# Patient Record
Sex: FEMALE | Race: OTHER | Employment: FULL TIME | URBAN - METROPOLITAN AREA
[De-identification: names, ages, dates, MRNs, and addresses within clinical notes are randomized per-mention and may not be internally consistent; named-entity substitution may affect disease eponyms.]

---

## 2021-02-15 ENCOUNTER — OFFICE VISIT (OUTPATIENT)
Dept: RHEUMATOLOGY | Facility: CLINIC | Age: 41
End: 2021-02-15
Payer: COMMERCIAL

## 2021-02-15 VITALS
HEIGHT: 63 IN | WEIGHT: 225 LBS | SYSTOLIC BLOOD PRESSURE: 130 MMHG | HEART RATE: 105 BPM | BODY MASS INDEX: 39.87 KG/M2 | DIASTOLIC BLOOD PRESSURE: 85 MMHG

## 2021-02-15 DIAGNOSIS — M79.18 DIFFUSE MYOFASCIAL PAIN SYNDROME: ICD-10-CM

## 2021-02-15 DIAGNOSIS — M47.816 LUMBAR SPONDYLOSIS: Primary | ICD-10-CM

## 2021-02-15 DIAGNOSIS — M15.9 PRIMARY OSTEOARTHRITIS INVOLVING MULTIPLE JOINTS: ICD-10-CM

## 2021-02-15 PROBLEM — M15.0 PRIMARY OSTEOARTHRITIS INVOLVING MULTIPLE JOINTS: Status: ACTIVE | Noted: 2021-02-15

## 2021-02-15 PROCEDURE — 3075F SYST BP GE 130 - 139MM HG: CPT | Performed by: INTERNAL MEDICINE

## 2021-02-15 PROCEDURE — 99244 OFF/OP CNSLTJ NEW/EST MOD 40: CPT | Performed by: INTERNAL MEDICINE

## 2021-02-15 PROCEDURE — 3008F BODY MASS INDEX DOCD: CPT | Performed by: INTERNAL MEDICINE

## 2021-02-15 PROCEDURE — 3079F DIAST BP 80-89 MM HG: CPT | Performed by: INTERNAL MEDICINE

## 2021-02-15 RX ORDER — PHENTERMINE HYDROCHLORIDE 37.5 MG/1
37.5 TABLET ORAL DAILY
COMMUNITY
Start: 2020-09-29

## 2021-02-15 RX ORDER — MELOXICAM 15 MG/1
15 TABLET ORAL AS NEEDED
COMMUNITY
End: 2021-02-15

## 2021-02-15 RX ORDER — MELOXICAM 15 MG/1
TABLET ORAL
Qty: 30 TABLET | Refills: 2 | Status: SHIPPED | OUTPATIENT
Start: 2021-02-15 | End: 2021-04-09

## 2021-02-15 RX ORDER — GABAPENTIN 100 MG/1
CAPSULE ORAL
Qty: 60 CAPSULE | Refills: 2 | Status: SHIPPED | OUTPATIENT
Start: 2021-02-15

## 2021-02-15 NOTE — PROGRESS NOTES
Dear Claudia Fung:    I saw your patient Epifanio Dutta in consultation this morning at your request, for evaluation of musculoskeletal pain. As you know, she is a 13-year-old woman who has had trouble with her right knee since she was a kid.   Also her low ba units of vitamin D weekly, levothyroxine, meloxicam 50 mg twice a week, ibuprofen 800 mg once a week, and phentermine. No known drug allergies. Family history:  Negative for autoimmune disorders. Social history:  She is single with no children.   She pathology. 3.  Diffuse myofascial pain, nonrestorative sleep, fatigue. She will try gabapentin 100 to 200 mg before bedtime. An exercise program should help. 4.  Obesity. You may want to refer her to the bariatrics clinic.       Thank you for invit

## 2021-03-01 ENCOUNTER — TELEPHONE (OUTPATIENT)
Dept: PHYSICAL THERAPY | Facility: HOSPITAL | Age: 41
End: 2021-03-01

## 2021-03-02 ENCOUNTER — OFFICE VISIT (OUTPATIENT)
Dept: PHYSICAL THERAPY | Facility: HOSPITAL | Age: 41
End: 2021-03-02
Attending: INTERNAL MEDICINE
Payer: COMMERCIAL

## 2021-03-02 DIAGNOSIS — M47.816 LUMBAR SPONDYLOSIS: ICD-10-CM

## 2021-03-02 DIAGNOSIS — M79.18 DIFFUSE MYOFASCIAL PAIN SYNDROME: ICD-10-CM

## 2021-03-02 DIAGNOSIS — M15.9 PRIMARY OSTEOARTHRITIS INVOLVING MULTIPLE JOINTS: ICD-10-CM

## 2021-03-02 PROCEDURE — 97140 MANUAL THERAPY 1/> REGIONS: CPT

## 2021-03-02 PROCEDURE — 97110 THERAPEUTIC EXERCISES: CPT

## 2021-03-02 PROCEDURE — 97162 PT EVAL MOD COMPLEX 30 MIN: CPT

## 2021-03-02 NOTE — PROGRESS NOTES
SPINE EVALUATION:   Referring Physician: Dr. Jessica Aquino  Diagnosis: Dx: Lumbar spondylosis (A87.901)  Primary osteoarthritis involving multiple joints (M89.49)  Diffuse myofascial pain syndrome (M79.18) Date of Service: 3/2/2021     PATIENT SUMMARY   Dann Blackwood than 10 lbs. Signs and symptoms are consistent with diagnosis of extension rotation syndrome and Femoral anterior glide medial rotation with semgental mobility restrictions of the lumbosacral spine and hips. Stage of condition chronic.  Irritability modera expectations, and prognosis.  Pt was also provided recommendations for activity modifications, possible soreness after evaluation and importance of remaining active  Patient was instructed in and issued a HEP for: quadruped rocking  Tx: Long axis traction S [phone]    Sincerely,  Electronically signed by therapist: Ronak Figueroa PT    [de-identified] certification required: Yes  I certify the need for these services furnished under this plan of treatment and while under my care.     X________________________

## 2021-03-09 ENCOUNTER — OFFICE VISIT (OUTPATIENT)
Dept: PHYSICAL THERAPY | Facility: HOSPITAL | Age: 41
End: 2021-03-09
Attending: INTERNAL MEDICINE
Payer: COMMERCIAL

## 2021-03-09 PROCEDURE — 97140 MANUAL THERAPY 1/> REGIONS: CPT

## 2021-03-09 PROCEDURE — 97110 THERAPEUTIC EXERCISES: CPT

## 2021-03-09 NOTE — PROGRESS NOTES
Dx:  Lumbar spondylosis (M47.816)  Primary osteoarthritis involving multiple joints (M89.49)  Diffuse myofascial pain syndrome (M79.18)           Insurance (Authorized # of Visits): 12  ([id number])           Authorizing Physician: Dr. Bibi Geiger hold 5 sec x10  -TA bracing hold 10 sec (x5 min)  -Segmental lumbar flexion seated and standing x5  -Bed mobility training with TA bracing       HEP - added TA bracing, Segmental lumbar flexion standing                  Charges: MT, EX 2       Total Timed

## 2021-03-12 ENCOUNTER — OFFICE VISIT (OUTPATIENT)
Dept: PHYSICAL THERAPY | Facility: HOSPITAL | Age: 41
End: 2021-03-12
Attending: INTERNAL MEDICINE
Payer: COMMERCIAL

## 2021-03-12 PROCEDURE — 97110 THERAPEUTIC EXERCISES: CPT

## 2021-03-12 PROCEDURE — 97140 MANUAL THERAPY 1/> REGIONS: CPT

## 2021-03-12 NOTE — PROGRESS NOTES
Dx:  Lumbar spondylosis (M47.816)  Primary osteoarthritis involving multiple joints (M89.49)  Diffuse myofascial pain syndrome (M79.18)           Insurance (Authorized # of Visits): 12  ([id number])           Authorizing Physician: Dr. Mary Smith axis traction manipulation  -Prone anterior femoral glides gr 3 bilat  -Prone FABERs mobs gr 3 bilat      EX  -MET lumbar multifidi   -S/L clams with TA brace bilat hold 5 sec x10  -TA bracing hold 10 sec (x5 min)  -Segmental lumbar flexion seated and maikol

## 2021-03-15 ENCOUNTER — OFFICE VISIT (OUTPATIENT)
Dept: PHYSICAL THERAPY | Facility: HOSPITAL | Age: 41
End: 2021-03-15
Attending: INTERNAL MEDICINE
Payer: COMMERCIAL

## 2021-03-15 PROCEDURE — 97110 THERAPEUTIC EXERCISES: CPT

## 2021-03-15 PROCEDURE — 97140 MANUAL THERAPY 1/> REGIONS: CPT

## 2021-03-15 NOTE — PROGRESS NOTES
Dx:  Lumbar spondylosis (M47.816)  Primary osteoarthritis involving multiple joints (M89.49)  Diffuse myofascial pain syndrome (M79.18)           Insurance (Authorized # of Visits): 12  ([id number] [facility])           Authorizing Physician: Dr. Gala Mayo adama gr 3 bilat  -Prone Kana arisa gr 3 bilat MT  -STM R TFL and LFC  -Psoas release R supine  -mid T/S and TL manip  -bilat femoral long axis traction manipulation     EX  -MET lumbar multifidi   -S/L clams with TA brace bilat hold 5 sec x10  -TA braci

## 2021-03-18 ENCOUNTER — APPOINTMENT (OUTPATIENT)
Dept: PHYSICAL THERAPY | Facility: HOSPITAL | Age: 41
End: 2021-03-18
Attending: INTERNAL MEDICINE
Payer: COMMERCIAL

## 2021-03-18 ENCOUNTER — APPOINTMENT (OUTPATIENT)
Dept: PHYSICAL THERAPY | Facility: HOSPITAL | Age: 41
End: 2021-03-18
Payer: COMMERCIAL

## 2021-03-22 ENCOUNTER — OFFICE VISIT (OUTPATIENT)
Dept: PHYSICAL THERAPY | Facility: HOSPITAL | Age: 41
End: 2021-03-22
Attending: INTERNAL MEDICINE
Payer: COMMERCIAL

## 2021-03-22 PROCEDURE — 97110 THERAPEUTIC EXERCISES: CPT

## 2021-03-22 PROCEDURE — 97140 MANUAL THERAPY 1/> REGIONS: CPT

## 2021-03-22 NOTE — PROGRESS NOTES
Dx:  Lumbar spondylosis (M47.816)  Primary osteoarthritis involving multiple joints (M89.49)  Diffuse myofascial pain syndrome (M79.18)           Insurance (Authorized # of Visits): 12  ([id number])           Authorizing Physician: Dr. Elliot Crump segmental flexion mobs gr 3  -mid T/S and TL manip  -Long axis traction SIJ manip bilat  -FMP seated lumbar flexion tissue technique.  MT  -L5-S1 segmental flexion mobs gr 3  -mid T/S and TL manip  -CPA L1-4 gr 3  -bilat femoral long axis traction manipulat flexor stretch and prone glut sets HEP - added prone hip ext              Charges: MT, EX 3       Total Timed Treatment: MT 15 min, EX 38 min  Total Treatment Time: 53 min

## 2021-03-29 ENCOUNTER — OFFICE VISIT (OUTPATIENT)
Dept: PHYSICAL THERAPY | Facility: HOSPITAL | Age: 41
End: 2021-03-29
Attending: INTERNAL MEDICINE
Payer: COMMERCIAL

## 2021-03-29 PROCEDURE — 97110 THERAPEUTIC EXERCISES: CPT

## 2021-03-29 PROCEDURE — 97140 MANUAL THERAPY 1/> REGIONS: CPT

## 2021-04-01 ENCOUNTER — TELEPHONE (OUTPATIENT)
Dept: PHYSICAL THERAPY | Facility: HOSPITAL | Age: 41
End: 2021-04-01

## 2021-04-01 ENCOUNTER — APPOINTMENT (OUTPATIENT)
Dept: PHYSICAL THERAPY | Facility: HOSPITAL | Age: 41
End: 2021-04-01
Attending: INTERNAL MEDICINE
Payer: COMMERCIAL

## 2021-04-05 NOTE — PROGRESS NOTES
Dx:  Lumbar spondylosis (M47.816)  Primary osteoarthritis involving multiple joints (M89.49)  Diffuse myofascial pain syndrome (M79.18)           Insurance (Authorized # of Visits): 12  ([id number])           Authorizing Physician: Dr. Eliseo Stiles segmental flexion mobs gr 3  -mid T/S and TL manip  -Long axis traction SIJ manip bilat  -FMP seated lumbar flexion tissue technique.  MT  -L5-S1 segmental flexion mobs gr 3  -mid T/S and TL manip  -CPA L1-4 gr 3  -bilat femoral long axis traction manipulat flexor stretch and prone glut sets HEP - added prone hip ext              Charges: MT, EX 3       Total Timed Treatment: MT 15 min, EX 38 min  Total Treatment Time: 53 min

## 2021-04-08 ENCOUNTER — TELEPHONE (OUTPATIENT)
Dept: PHYSICAL THERAPY | Facility: HOSPITAL | Age: 41
End: 2021-04-08

## 2021-04-09 ENCOUNTER — OFFICE VISIT (OUTPATIENT)
Dept: RHEUMATOLOGY | Facility: CLINIC | Age: 41
End: 2021-04-09
Payer: COMMERCIAL

## 2021-04-09 VITALS
HEART RATE: 98 BPM | WEIGHT: 227 LBS | BODY MASS INDEX: 40.22 KG/M2 | HEIGHT: 63 IN | DIASTOLIC BLOOD PRESSURE: 82 MMHG | SYSTOLIC BLOOD PRESSURE: 115 MMHG

## 2021-04-09 DIAGNOSIS — E66.01 CLASS 3 SEVERE OBESITY WITHOUT SERIOUS COMORBIDITY WITH BODY MASS INDEX (BMI) OF 40.0 TO 44.9 IN ADULT, UNSPECIFIED OBESITY TYPE (HCC): ICD-10-CM

## 2021-04-09 DIAGNOSIS — M15.9 PRIMARY OSTEOARTHRITIS INVOLVING MULTIPLE JOINTS: Primary | ICD-10-CM

## 2021-04-09 PROCEDURE — 3074F SYST BP LT 130 MM HG: CPT | Performed by: INTERNAL MEDICINE

## 2021-04-09 PROCEDURE — 3079F DIAST BP 80-89 MM HG: CPT | Performed by: INTERNAL MEDICINE

## 2021-04-09 PROCEDURE — 99213 OFFICE O/P EST LOW 20 MIN: CPT | Performed by: INTERNAL MEDICINE

## 2021-04-09 PROCEDURE — 3008F BODY MASS INDEX DOCD: CPT | Performed by: INTERNAL MEDICINE

## 2021-04-09 RX ORDER — MELOXICAM 15 MG/1
TABLET ORAL
Qty: 90 TABLET | Refills: 1 | Status: SHIPPED | OUTPATIENT
Start: 2021-04-09

## 2021-04-09 NOTE — PROGRESS NOTES
HPI/Subjective:   Patient ID: Latha Paulino is a 38year old female. Lamont Archibald is a 79-year-old woman I saw for Dr. Lety Sales in consultation February 15th of 2021, with osteoarthritis, obesity, and diffuse myofascial pain syndrome.     She has had 2 sessi are normal.      Palpations: Abdomen is soft. Musculoskeletal:      Comments: She has diffuse myofascial discomfort to palpation. Skin:     Findings: No rash. Neurological:      Mental Status: She is oriented to person, place, and time.        Blood p

## 2021-04-19 ENCOUNTER — OFFICE VISIT (OUTPATIENT)
Dept: PHYSICAL THERAPY | Facility: HOSPITAL | Age: 41
End: 2021-04-19
Attending: INTERNAL MEDICINE
Payer: COMMERCIAL

## 2021-04-19 PROCEDURE — 97140 MANUAL THERAPY 1/> REGIONS: CPT

## 2021-04-19 PROCEDURE — 97110 THERAPEUTIC EXERCISES: CPT

## 2021-04-19 NOTE — PROGRESS NOTES
Has been doing well since last session.  Trying     Lumbar AROM pain-free all planes except ext      FDN L4 and L5 bilat    Candido test stretch  Prone hip ext

## 2021-04-30 ENCOUNTER — OFFICE VISIT (OUTPATIENT)
Dept: PHYSICAL THERAPY | Facility: HOSPITAL | Age: 41
End: 2021-04-30
Attending: INTERNAL MEDICINE
Payer: COMMERCIAL

## 2021-04-30 PROCEDURE — 97140 MANUAL THERAPY 1/> REGIONS: CPT

## 2021-04-30 PROCEDURE — 97110 THERAPEUTIC EXERCISES: CPT

## 2021-04-30 NOTE — PROGRESS NOTES
Reports less pain in lower back and improved flexibility since functional dry needling (FDN). States she was uncomfortable for a couple of days.        Lumbar AROM pain-free all planes except ext and R SB.    (+) R flick      FDN L4 and L5 bilat with e-stim

## 2021-05-03 ENCOUNTER — OFFICE VISIT (OUTPATIENT)
Dept: PHYSICAL THERAPY | Facility: HOSPITAL | Age: 41
End: 2021-05-03
Attending: INTERNAL MEDICINE
Payer: COMMERCIAL

## 2021-05-03 PROCEDURE — 97110 THERAPEUTIC EXERCISES: CPT

## 2021-05-03 PROCEDURE — 97140 MANUAL THERAPY 1/> REGIONS: CPT

## 2021-05-03 NOTE — PROGRESS NOTES
Today, low back pain on L achy 6/10. R side ok. No change in activity levels. Feels the benefit of functional dry needling (FDN). Has not been as compliant with her stretching regimen. Pain-free post session.  Reiterated to pt importance of HEP comp

## 2021-05-10 ENCOUNTER — OFFICE VISIT (OUTPATIENT)
Dept: PHYSICAL THERAPY | Facility: HOSPITAL | Age: 41
End: 2021-05-10
Attending: INTERNAL MEDICINE
Payer: COMMERCIAL

## 2021-05-10 PROCEDURE — 97140 MANUAL THERAPY 1/> REGIONS: CPT

## 2021-05-10 PROCEDURE — 97110 THERAPEUTIC EXERCISES: CPT

## 2021-05-17 ENCOUNTER — APPOINTMENT (OUTPATIENT)
Dept: PHYSICAL THERAPY | Facility: HOSPITAL | Age: 41
End: 2021-05-17
Attending: INTERNAL MEDICINE
Payer: COMMERCIAL

## 2021-05-17 NOTE — PROGRESS NOTES
Lower back was feeling great, so decided to start doing impact activities. Did jumping jacks for exercise which flared up her back, R lower lumbar.      R SB and Rotation limited    (+) R flick   TL junction restricted    FDN L4-5 multifidi bilat  BKFO biof

## 2021-05-24 ENCOUNTER — APPOINTMENT (OUTPATIENT)
Dept: PHYSICAL THERAPY | Facility: HOSPITAL | Age: 41
End: 2021-05-24
Attending: INTERNAL MEDICINE
Payer: COMMERCIAL

## 2021-05-24 ENCOUNTER — TELEPHONE (OUTPATIENT)
Dept: PHYSICAL THERAPY | Facility: HOSPITAL | Age: 41
End: 2021-05-24

## 2024-08-03 ENCOUNTER — APPOINTMENT (OUTPATIENT)
Dept: GENERAL RADIOLOGY | Facility: HOSPITAL | Age: 44
End: 2024-08-03
Attending: NURSE PRACTITIONER
Payer: COMMERCIAL

## 2024-08-03 ENCOUNTER — HOSPITAL ENCOUNTER (EMERGENCY)
Facility: HOSPITAL | Age: 44
Discharge: HOME | End: 2024-08-03
Payer: COMMERCIAL

## 2024-08-03 VITALS
RESPIRATION RATE: 18 BRPM | TEMPERATURE: 99 F | DIASTOLIC BLOOD PRESSURE: 68 MMHG | WEIGHT: 206 LBS | SYSTOLIC BLOOD PRESSURE: 149 MMHG | HEIGHT: 63 IN | HEART RATE: 98 BPM | BODY MASS INDEX: 36.5 KG/M2 | OXYGEN SATURATION: 99 %
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
## 2024-08-03 DIAGNOSIS — S99.911A RIGHT ANKLE INJURY, INITIAL ENCOUNTER: ICD-10-CM

## 2024-08-03 DIAGNOSIS — M54.50 ACUTE LOW BACK PAIN DUE TO TRAUMA: Primary | ICD-10-CM

## 2024-08-03 DIAGNOSIS — G89.11 ACUTE LOW BACK PAIN DUE TO TRAUMA: Primary | ICD-10-CM

## 2024-08-03 PROCEDURE — 72100 X-RAY EXAM L-S SPINE 2/3 VWS: CPT | Performed by: NURSE PRACTITIONER

## 2024-08-03 PROCEDURE — 99284 EMERGENCY DEPT VISIT MOD MDM: CPT

## 2024-08-03 PROCEDURE — 73610 X-RAY EXAM OF ANKLE: CPT | Performed by: NURSE PRACTITIONER

## 2024-08-03 RX ORDER — CYCLOBENZAPRINE HCL 5 MG
5 TABLET ORAL NIGHTLY
Qty: 15 TABLET | Refills: 0 | Status: SHIPPED | OUTPATIENT
Start: 2024-08-03

## 2024-08-03 RX ORDER — NAPROXEN 500 MG/1
500 TABLET ORAL 2 TIMES DAILY PRN
Qty: 20 TABLET | Refills: 0 | Status: SHIPPED | OUTPATIENT
Start: 2024-08-03 | End: 2024-08-13

## 2024-08-03 RX ORDER — IBUPROFEN 600 MG/1
600 TABLET ORAL ONCE
Status: COMPLETED | OUTPATIENT
Start: 2024-08-03 | End: 2024-08-03

## 2024-08-04 NOTE — ED QUICK NOTES
Patient safe to DC home per NP. Able to dress self. DC teaching done, instructions reviewed with patient including when and how to follow up with healthcare providers and when to seek emergency care. The patient verbalizes understanding. Patient ambulatory to exit.

## 2024-08-04 NOTE — DISCHARGE INSTRUCTIONS
Flexeril at night as needed for low back pain  Naproxen twice a day as needed for pain  Likely experience soreness to upper and lower back as well as neck for the next few days  Be sure to perform gentle stretches
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
